# Patient Record
Sex: FEMALE | Race: WHITE | NOT HISPANIC OR LATINO | URBAN - METROPOLITAN AREA
[De-identification: names, ages, dates, MRNs, and addresses within clinical notes are randomized per-mention and may not be internally consistent; named-entity substitution may affect disease eponyms.]

---

## 2017-03-27 ENCOUNTER — OFFICE (OUTPATIENT)
Dept: URBAN - METROPOLITAN AREA CLINIC 101 | Facility: CLINIC | Age: 57
End: 2017-03-27

## 2017-03-27 VITALS
HEIGHT: 60 IN | HEART RATE: 82 BPM | DIASTOLIC BLOOD PRESSURE: 62 MMHG | SYSTOLIC BLOOD PRESSURE: 107 MMHG | WEIGHT: 145 LBS | TEMPERATURE: 97.9 F

## 2017-03-27 DIAGNOSIS — R14.0 ABDOMINAL DISTENSION (GASEOUS): ICD-10-CM

## 2017-03-27 DIAGNOSIS — K59.09 OTHER CONSTIPATION: ICD-10-CM

## 2017-03-27 DIAGNOSIS — R93.5 ABNORMAL FINDINGS ON DIAGNOSTIC IMAGING OF OTHER ABDOMINAL R: ICD-10-CM

## 2017-03-27 PROCEDURE — 99244 OFF/OP CNSLTJ NEW/EST MOD 40: CPT

## 2017-03-27 NOTE — SERVICEHPINOTES
FREDERICK STOVALL   is a   57  female who presents for f/u from [facility] ER. She went to [facility] 3/24/17 and she had a Ct scan of abd/pelvis told she has stool throughout the colon. She has taken 4 bottles of mg citrate and 3 fleets enemas since Friday. Also taking Miralax BID x 4 weeks. Last night she started passing brown watery stools. She passed watery brown stools this morning.  She reports having a colonoscopy 2-3 years ago at [facility]-she reports having polyps.   No family history of colon cancer. She reports #10 weight gain recently since constipation worsened. She went to the ER 3/26/17 for lower abdominal pain and constipation. She had a normal CBC. Ct abd/pelvis-increased stool throughout the colon and rectum, no obstruction. Normally she has a BM about twice a week. Stools are BSS type 1 and 3. Notes periodic BRBPR. Denies rectal pain and pruritus. She has had issues with constipation for about 1-2 years now. She doesn't eat a lot of fruits or vegetables.

## 2017-03-30 ENCOUNTER — OFFICE (OUTPATIENT)
Dept: URBAN - METROPOLITAN AREA CLINIC 32 | Facility: CLINIC | Age: 57
End: 2017-03-30

## 2017-03-30 ENCOUNTER — OFFICE (OUTPATIENT)
Dept: URBAN - METROPOLITAN AREA CLINIC 32 | Facility: CLINIC | Age: 57
End: 2017-03-30
Payer: COMMERCIAL

## 2017-03-30 VITALS
DIASTOLIC BLOOD PRESSURE: 81 MMHG | OXYGEN SATURATION: 94 % | DIASTOLIC BLOOD PRESSURE: 62 MMHG | OXYGEN SATURATION: 96 % | RESPIRATION RATE: 16 BRPM | SYSTOLIC BLOOD PRESSURE: 96 MMHG | SYSTOLIC BLOOD PRESSURE: 136 MMHG | TEMPERATURE: 98.2 F | HEART RATE: 88 BPM | HEIGHT: 60 IN | DIASTOLIC BLOOD PRESSURE: 64 MMHG | WEIGHT: 145 LBS | TEMPERATURE: 97.7 F | RESPIRATION RATE: 12 BRPM | HEART RATE: 85 BPM | SYSTOLIC BLOOD PRESSURE: 103 MMHG

## 2017-03-30 DIAGNOSIS — Z86.010 PERSONAL HISTORY OF COLONIC POLYPS: ICD-10-CM

## 2017-03-30 DIAGNOSIS — K59.09 OTHER CONSTIPATION: ICD-10-CM

## 2017-03-30 PROCEDURE — 00810: CPT | Mod: AA,QS
